# Patient Record
Sex: FEMALE | Race: WHITE | NOT HISPANIC OR LATINO | Employment: FULL TIME | URBAN - METROPOLITAN AREA
[De-identification: names, ages, dates, MRNs, and addresses within clinical notes are randomized per-mention and may not be internally consistent; named-entity substitution may affect disease eponyms.]

---

## 2024-01-30 ENCOUNTER — PATIENT OUTREACH (OUTPATIENT)
Dept: ADMINISTRATIVE | Facility: HOSPITAL | Age: 69
End: 2024-01-30

## 2024-01-30 NOTE — LETTER
January 30, 2024      Gian Aguirre MD  [address] [facility]  [address]  [place] [address]         Alirio Tubbs MD  [address] [place]  [place] [address] [phone]   Patient: Ashley Aknis   MR Number: [id number]   YOB: 1955           Dear Dr. Aguirre:    Ashley Akins is a patient of Alirio Tubbs MD(PCP). While reviewing his/her chart, it has come to our attention that there is an outstanding lab/procedure. Please help keep our Health Maintenance records as accurate and as up to date as possible by supplying the following:    Comprehensive Eye Exam from 85/8/2023                                                                             Please fax report(s) [facility]/Performance Improvement Department @ [phone].    Thank you for your assistance in our patient's healthcare.     Sincerely,     Pily Anne LPN  [facility]  Performance Improvement Coordinator for Alirio Tubbs MD  [phone] FAX

## 2024-11-14 ENCOUNTER — PATIENT OUTREACH (OUTPATIENT)
Dept: ADMINISTRATIVE | Facility: HOSPITAL | Age: 69
End: 2024-11-14

## 2024-11-14 NOTE — LETTER
SECOND REQUEST     November 14, 2024        Gian Aguirre MD  [address] [facility]  [address]  [facility] [address]             Alirio Tubbs MD  [address] [facility]   [place] [address] [phone]   Patient: Ashley Akins   MR Number: [id number]   YOB: 1955       Dear Dr. Aguirre:    Ashley Akins is a patient of Alirio Tubbs MD(PCP). While reviewing his/her chart, it has come to our attention that there is an outstanding lab/procedure. Please help keep our Health Maintenance records as accurate and as up to date as possible by supplying the following:    Comprehensive Diabetic Eye Exam                                                                             Please fax report(s) [facility]/Performance Improvement Department @ [phone].    Thank you for your assistance in our patient's healthcare.     Sincerely,     Pily Anne LPN  [facility]  Performance Improvement Coordinator for Alirio Tubbs MD  [phone] FAX